# Patient Record
Sex: MALE | URBAN - NONMETROPOLITAN AREA
[De-identification: names, ages, dates, MRNs, and addresses within clinical notes are randomized per-mention and may not be internally consistent; named-entity substitution may affect disease eponyms.]

---

## 2024-02-01 ENCOUNTER — TRANSCRIBE ORDERS (OUTPATIENT)
Dept: CARDIOLOGY CLINIC | Facility: CLINIC | Age: 64
End: 2024-02-01

## 2024-02-01 ENCOUNTER — TELEPHONE (OUTPATIENT)
Dept: CARDIOLOGY CLINIC | Facility: CLINIC | Age: 64
End: 2024-02-01

## 2024-02-01 DIAGNOSIS — N43.40 SPERMATOCELE: Primary | ICD-10-CM

## 2024-03-01 RX ORDER — LORATADINE 10 MG/1
10 TABLET ORAL AS NEEDED
COMMUNITY

## 2024-03-05 ENCOUNTER — OFFICE VISIT (OUTPATIENT)
Dept: UROLOGY | Facility: CLINIC | Age: 64
End: 2024-03-05
Payer: COMMERCIAL

## 2024-03-05 VITALS
BODY MASS INDEX: 24.53 KG/M2 | HEIGHT: 71 IN | TEMPERATURE: 98.3 F | WEIGHT: 175.2 LBS | HEART RATE: 73 BPM | RESPIRATION RATE: 18 BRPM | SYSTOLIC BLOOD PRESSURE: 132 MMHG | DIASTOLIC BLOOD PRESSURE: 70 MMHG | OXYGEN SATURATION: 99 %

## 2024-03-05 DIAGNOSIS — R35.1 BENIGN PROSTATIC HYPERPLASIA WITH NOCTURIA: ICD-10-CM

## 2024-03-05 DIAGNOSIS — N40.1 BENIGN PROSTATIC HYPERPLASIA WITH NOCTURIA: ICD-10-CM

## 2024-03-05 DIAGNOSIS — N43.40 SPERMATOCELE: Primary | ICD-10-CM

## 2024-03-05 LAB
SL AMB  POCT GLUCOSE, UA: NORMAL
SL AMB LEUKOCYTE ESTERASE,UA: NORMAL
SL AMB POCT BILIRUBIN,UA: NORMAL
SL AMB POCT BLOOD,UA: NORMAL
SL AMB POCT CLARITY,UA: CLEAR
SL AMB POCT COLOR,UA: YELLOW
SL AMB POCT KETONES,UA: NORMAL
SL AMB POCT NITRITE,UA: NORMAL
SL AMB POCT PH,UA: 5.5
SL AMB POCT SPECIFIC GRAVITY,UA: 1.01
SL AMB POCT URINE PROTEIN: NORMAL
SL AMB POCT UROBILINOGEN: 0.2

## 2024-03-05 PROCEDURE — 99213 OFFICE O/P EST LOW 20 MIN: CPT | Performed by: UROLOGY

## 2024-03-05 PROCEDURE — 81003 URINALYSIS AUTO W/O SCOPE: CPT | Performed by: UROLOGY

## 2024-03-05 RX ORDER — SIMVASTATIN 20 MG
20 TABLET ORAL
COMMUNITY

## 2024-03-05 RX ORDER — LISINOPRIL 10 MG/1
10 TABLET ORAL DAILY
COMMUNITY

## 2024-03-05 NOTE — PROGRESS NOTES
UROLOGY PROGRESS NOTE         NAME: Ian Monroe  AGE: 63 y.o. SEX: male  : 1960   MRN: [id number]    DATE: 3/5/2024  TIME: 12:28 PM    Assessment and Plan      Impression:   1. Spermatocele  -     Ambulatory referral to Urology  -     POCT urine dip auto non-scope    2. Benign prostatic hyperplasia with nocturia    History of kidney stone.  History of BPH.  History of a spermatocele.  Patient is having more bothersome symptoms with his spermatocele has gotten larger.  He gets in the way sometimes.  Pros and cons surgical procedure and conservative measures discussed.     Plan: We will see him back in the fall after he travels with a PSA and scrotal ultrasound consider spermatocele ectomy.      Chief Complaint     Chief Complaint   Patient presents with    New Patient Visit     Has been seen in the past by urology at [facility]. Patient has a testicle lump to the left side that has been monitored over the past few years. He said there are times that he feels pressure to the area. Wanted to discuss options of having the area removed if possible.      History of Present Illness     HPI: Ian Monroe is a 63 y.o. year old male who presents with followed for many years by Dr. Mathews.  He had a kidney stone in .  He has a spermatocele which been enlarging over the years.  And now it does bother him sometimes it gets in the way when he sleeping and can have some discomfort but for the most part it does not bother him.  No more kidney stone troubles.  PSAs have always been low.  He has very mild BPH with nocturia once a night.  This is also not bothersome.  His urinalysis is negative today.              The following portions of the patient's history were reviewed and updated as appropriate: allergies, current medications, past family history, past medical history, past social history, past surgical history and problem list.  Past Medical History:   Diagnosis Date    BPH (benign prostatic hyperplasia)      "Hematuria     HTN (hypertension)     Renal calculi     Spermatocele     Umbilical hernia      History reviewed. No pertinent surgical history.  shoulder  Review of Systems     Const: Denies chills, fever and weight loss.  CV: Denies chest pain.  Resp: Denies SOB.  GI: Denies abdominal pain, nausea and vomiting.  : Denies symptoms other than stated above.  Musculo: Denies back pain.    Objective   /70   Pulse 73   Temp 98.3 °F (36.8 °C) (Tympanic)   Resp 18   Ht 5' 10.5\" (1.791 m)   Wt 79.5 kg (175 lb 3.2 oz)   SpO2 99%   BMI 24.78 kg/m²     Physical Exam  Const: Appears healthy and well developed. No signs of acute distress present.  Resp: Respirations are regular and unlabored.   CV: Rate is regular. Rhythm is regular.  Abdomen: Abdomen is soft, nontender, and nondistended. Kidneys are not palpable.  : Penis testicles normal.  He has a large left spermatocele.  Does not feel like he has a hernia although I was unable to be certain.  Ultrasound will be best.  Prostate 1+ benign.  Nontender.  It does transilluminate.  Psych: Patient's attitude is cooperative. Mood is normal. Affect is normal.    Current Medications     Current Outpatient Medications:     Coenzyme Q10 10 MG capsule, Take 10 mg by mouth daily, Disp: , Rfl:     lisinopril (ZESTRIL) 10 mg tablet, Take 10 mg by mouth daily, Disp: , Rfl:     loratadine (CLARITIN) 10 mg tablet, Take 10 mg by mouth if needed, Disp: , Rfl:     simvastatin (ZOCOR) 20 mg tablet, Take 20 mg by mouth daily at bedtime, Disp: , Rfl:         Frank D'Amico, MD        "

## 2024-10-30 ENCOUNTER — TELEPHONE (OUTPATIENT)
Dept: UROLOGY | Facility: CLINIC | Age: 64
End: 2024-10-30

## 2024-11-01 ENCOUNTER — HOSPITAL ENCOUNTER (OUTPATIENT)
Dept: ULTRASOUND IMAGING | Facility: HOSPITAL | Age: 64
End: 2024-11-01
Attending: UROLOGY
Payer: COMMERCIAL

## 2024-11-01 DIAGNOSIS — N43.40 SPERMATOCELE: ICD-10-CM

## 2024-11-01 DIAGNOSIS — R35.1 BENIGN PROSTATIC HYPERPLASIA WITH NOCTURIA: ICD-10-CM

## 2024-11-01 DIAGNOSIS — N40.1 BENIGN PROSTATIC HYPERPLASIA WITH NOCTURIA: ICD-10-CM

## 2024-11-01 PROCEDURE — 76870 US EXAM SCROTUM: CPT

## 2024-11-06 ENCOUNTER — TELEPHONE (OUTPATIENT)
Dept: UROLOGY | Facility: CLINIC | Age: 64
End: 2024-11-06

## 2024-11-06 ENCOUNTER — OFFICE VISIT (OUTPATIENT)
Dept: UROLOGY | Facility: CLINIC | Age: 64
End: 2024-11-06
Payer: COMMERCIAL

## 2024-11-06 VITALS
OXYGEN SATURATION: 98 % | HEIGHT: 70 IN | BODY MASS INDEX: 26 KG/M2 | SYSTOLIC BLOOD PRESSURE: 140 MMHG | HEART RATE: 94 BPM | DIASTOLIC BLOOD PRESSURE: 66 MMHG | TEMPERATURE: 98.6 F | WEIGHT: 181.6 LBS

## 2024-11-06 DIAGNOSIS — N43.40 SPERMATOCELE: Primary | ICD-10-CM

## 2024-11-06 PROCEDURE — 99214 OFFICE O/P EST MOD 30 MIN: CPT | Performed by: UROLOGY

## 2024-11-06 RX ORDER — CIPROFLOXACIN 2 MG/ML
400 INJECTION, SOLUTION INTRAVENOUS ONCE
OUTPATIENT
Start: 2024-11-06 | End: 2024-11-06

## 2024-11-06 NOTE — TELEPHONE ENCOUNTER
Sent message to patient in the patient portal that he can review his test results and that they will be reviewed with the provider at the appointment today.

## 2024-11-06 NOTE — TELEPHONE ENCOUNTER
----- Message from Frank D'Amico, MD sent at 11/5/2024  3:26 PM EST -----  These copy patient will discuss at visit tomorrow.  Thank you  ----- Message -----  From: Interface, Radiology Results In  Sent: 11/1/2024  10:43 AM EST  To: Frank D'Amico, MD

## 2024-11-06 NOTE — PROGRESS NOTES
"UROLOGY PROGRESS NOTE         NAME: Ian Monroe  AGE: 64 y.o. SEX: male  : 1960   MRN: [id number]    DATE: 2024  TIME: 2:32 PM    Assessment and Plan      Impression:   1. Spermatocele  -     Case request operating room: SPERMATOCELECTOMY; Standing  -     Case request operating room: SPERMATOCELECTOMY    He would like to proceed with left spermatocelectomy pros cons options expectations process discussed.   Plan: Spermatocelectomy at his discretion.      Chief Complaint     Chief Complaint   Patient presents with    Follow-up     History of Present Illness     HPI: Ian Monroe is a 64 y.o. year old male who presents with history of a large left scrotal mass turns out to be a spermatocele on ultrasound.  Testicles are normal.  This is bothersome he gets in the way of things.  We talked about the surgical procedure to take care of this.  Risk of complications and recovery discussed.  He may or may not need a drain.              The following portions of the patient's history were reviewed and updated as appropriate: allergies, current medications, past family history, past medical history, past social history, past surgical history and problem list.  Past Medical History:   Diagnosis Date    BPH (benign prostatic hyperplasia)     Hematuria     HTN (hypertension)     Kidney stone     Renal calculi     Spermatocele     Umbilical hernia      Past Surgical History:   Procedure Laterality Date    LITHOTRIPSY       shoulder  Review of Systems     Const: Denies chills, fever and weight loss.  CV: Denies chest pain.  Resp: Denies SOB.  GI: Denies abdominal pain, nausea and vomiting.  : Denies symptoms other than stated above.  Musculo: Denies back pain.    Objective   /66   Pulse 94   Temp 98.6 °F (37 °C)   Ht 5' 10\" (1.778 m)   Wt 82.4 kg (181 lb 9.6 oz)   SpO2 98%   BMI 26.06 kg/m²     Physical Exam  Const: Appears healthy and well developed. No signs of acute distress present.  Resp: " Respirations are regular and unlabored.   CV: Rate is regular. Rhythm is regular.  Abdomen: Abdomen is soft, nontender, and nondistended. Kidneys are not palpable.  : Large left scrotal lesion no hernias noted.  Testicles are normal.  Spermatocele.  Psych: Patient's attitude is cooperative. Mood is normal. Affect is normal.    Current Medications     Current Outpatient Medications:     lisinopril (ZESTRIL) 10 mg tablet, Take 10 mg by mouth daily, Disp: , Rfl:     simvastatin (ZOCOR) 20 mg tablet, Take 20 mg by mouth daily at bedtime, Disp: , Rfl:     Coenzyme Q10 10 MG capsule, Take 10 mg by mouth daily (Patient not taking: Reported on 11/6/2024), Disp: , Rfl:     loratadine (CLARITIN) 10 mg tablet, Take 10 mg by mouth if needed (Patient not taking: Reported on 11/6/2024), Disp: , Rfl:         Frank D'Amico, MD

## 2024-11-06 NOTE — Clinical Note
Please set him up for a left spermatocele ectomy general anesthesia.  Follow-up for drain removal 2 to 3 days later.

## 2024-11-07 ENCOUNTER — TELEPHONE (OUTPATIENT)
Dept: UROLOGY | Facility: CLINIC | Age: 64
End: 2024-11-07

## 2024-11-07 NOTE — TELEPHONE ENCOUNTER
Spoke to pt, provided available dates for OR with Dr D'Amico. He will c/b after speaking to sister.

## 2024-11-08 ENCOUNTER — PREP FOR PROCEDURE (OUTPATIENT)
Dept: UROLOGY | Facility: CLINIC | Age: 64
End: 2024-11-08

## 2024-11-08 DIAGNOSIS — R39.89 SUSPECTED UTI: ICD-10-CM

## 2024-11-08 DIAGNOSIS — Z01.818 ENCOUNTER FOR PREADMISSION TESTING: Primary | ICD-10-CM

## 2024-11-08 NOTE — TELEPHONE ENCOUNTER
Spoke with patient and confirmed surgery date of: 1/3  Type of surgery: left spermatocelectomy  Operating physician: Dr. D'Amico  Location of surgery: [facility]    Verbally went over prep with patient on: 11/8  NPO  Bowel prep? No  Hospital calls afternoon prior with arrival time -Calls Friday afternoon for Monday surgeries  Patient needs ride to and from surgery (outpatient)   Pre-op testing to be done 2 weeks prior to surgery  (CBC, BMP, UC, EKG)  Blood thinners: N/A  Clearances needed: (None)    Mailed to patient on: 11/8  Copy of packet scanned into Media on: 11/8  Labs in packet  Soap / Bowel prep in packet  Post-op in packet  Date of post-op 1/6    Consent: on admit

## 2024-12-20 NOTE — PRE-PROCEDURE INSTRUCTIONS
Pre-Surgery Instructions:   Medication Instructions    lisinopril (ZESTRIL) 10 mg tablet Hold day of surgery.    simvastatin (ZOCOR) 20 mg tablet Take night before surgery   Medication instructions for day surgery reviewed. Please use only a sip of water to take your instructed medications. Avoid all over the counter vitamins, supplements and NSAIDS for one week prior to surgery per anesthesia guidelines. Tylenol is ok to take as needed.     You will receive a call one business day prior to surgery with an arrival time and hospital directions. If your surgery is scheduled on a Monday, the hospital will be calling you on the Friday prior to your surgery. If you have not heard from anyone by 8pm, please call the hospital supervisor through the hospital  at [phone]. ([place] [phone] or [place] [phone]).    Do not eat or drink anything after midnight the night before your surgery, including candy, mints, lifesavers, or chewing gum. Do not drink alcohol 24hrs before your surgery. Try not to smoke at least 24hrs before your surgery.       Follow the pre surgery showering instructions as listed in the “My Surgical Experience Booklet” or otherwise provided by your surgeon's office. Do not use a blade to shave the surgical area 1 week before surgery. It is okay to use a clean electric clippers up to 24 hours before surgery. Do not apply any lotions, creams, including makeup, cologne, deodorant, or perfumes after showering on the day of your surgery. Do not use dry shampoo, hair spray, hair gel, or any type of hair products.     No contact lenses, eye make-up, or artificial eyelashes. Remove nail polish, including gel polish, and any artificial, gel, or acrylic nails if possible. Remove all jewelry including rings and body piercing jewelry.     Wear causal clothing that is easy to take on and off. Consider your type of surgery.    Keep any valuables, jewelry, piercings at home. Please bring any  specially ordered equipment (sling, braces) if indicated.    Arrange for a responsible person to drive you to and from the hospital on the day of your surgery. Please confirm the visitor policy for the day of your procedure when you receive your phone call with an arrival time.     Call the surgeon's office with any new illnesses, exposures, or additional questions prior to surgery.    Please reference your “My Surgical Experience Booklet” for additional information to prepare for your upcoming surgery.

## 2024-12-27 ENCOUNTER — LAB (OUTPATIENT)
Dept: LAB | Facility: HOSPITAL | Age: 64
End: 2024-12-27
Payer: COMMERCIAL

## 2024-12-27 ENCOUNTER — APPOINTMENT (OUTPATIENT)
Dept: LAB | Facility: HOSPITAL | Age: 64
End: 2024-12-27
Attending: UROLOGY
Payer: COMMERCIAL

## 2024-12-27 DIAGNOSIS — N40.1 BENIGN PROSTATIC HYPERPLASIA WITH NOCTURIA: ICD-10-CM

## 2024-12-27 DIAGNOSIS — Z01.818 ENCOUNTER FOR PREADMISSION TESTING: ICD-10-CM

## 2024-12-27 DIAGNOSIS — R35.1 BENIGN PROSTATIC HYPERPLASIA WITH NOCTURIA: ICD-10-CM

## 2024-12-27 DIAGNOSIS — N43.40 SPERMATOCELE: ICD-10-CM

## 2024-12-27 LAB
ANION GAP SERPL CALCULATED.3IONS-SCNC: 6 MMOL/L (ref 4–13)
ATRIAL RATE: 81 BPM
BASOPHILS # BLD AUTO: 0.09 THOUSANDS/ÂΜL (ref 0–0.1)
BASOPHILS NFR BLD AUTO: 1 % (ref 0–1)
BUN SERPL-MCNC: 13 MG/DL (ref 5–25)
CALCIUM SERPL-MCNC: 9.2 MG/DL (ref 8.4–10.2)
CHLORIDE SERPL-SCNC: 103 MMOL/L (ref 96–108)
CO2 SERPL-SCNC: 29 MMOL/L (ref 21–32)
CREAT SERPL-MCNC: 1.01 MG/DL (ref 0.6–1.3)
EOSINOPHIL # BLD AUTO: 0.13 THOUSAND/ÂΜL (ref 0–0.61)
EOSINOPHIL NFR BLD AUTO: 2 % (ref 0–6)
ERYTHROCYTE [DISTWIDTH] IN BLOOD BY AUTOMATED COUNT: 12.7 % (ref 11.6–15.1)
GFR SERPL CREATININE-BSD FRML MDRD: 78 ML/MIN/1.73SQ M
GLUCOSE P FAST SERPL-MCNC: 108 MG/DL (ref 65–99)
HCT VFR BLD AUTO: 45.8 % (ref 36.5–49.3)
HGB BLD-MCNC: 15.3 G/DL (ref 12–17)
IMM GRANULOCYTES # BLD AUTO: 0.03 THOUSAND/UL (ref 0–0.2)
IMM GRANULOCYTES NFR BLD AUTO: 1 % (ref 0–2)
LYMPHOCYTES # BLD AUTO: 1.59 THOUSANDS/ÂΜL (ref 0.6–4.47)
LYMPHOCYTES NFR BLD AUTO: 26 % (ref 14–44)
MCH RBC QN AUTO: 29.5 PG (ref 26.8–34.3)
MCHC RBC AUTO-ENTMCNC: 33.4 G/DL (ref 31.4–37.4)
MCV RBC AUTO: 88 FL (ref 82–98)
MONOCYTES # BLD AUTO: 0.52 THOUSAND/ÂΜL (ref 0.17–1.22)
MONOCYTES NFR BLD AUTO: 8 % (ref 4–12)
NEUTROPHILS # BLD AUTO: 3.86 THOUSANDS/ÂΜL (ref 1.85–7.62)
NEUTS SEG NFR BLD AUTO: 62 % (ref 43–75)
NRBC BLD AUTO-RTO: 0 /100 WBCS
P AXIS: 72 DEGREES
PLATELET # BLD AUTO: 264 THOUSANDS/UL (ref 149–390)
PMV BLD AUTO: 10 FL (ref 8.9–12.7)
POTASSIUM SERPL-SCNC: 4.6 MMOL/L (ref 3.5–5.3)
PR INTERVAL: 152 MS
PSA SERPL-MCNC: 1.98 NG/ML (ref 0–4)
QRS AXIS: 49 DEGREES
QRSD INTERVAL: 88 MS
QT INTERVAL: 370 MS
QTC INTERVAL: 429 MS
RBC # BLD AUTO: 5.19 MILLION/UL (ref 3.88–5.62)
SODIUM SERPL-SCNC: 138 MMOL/L (ref 135–147)
T WAVE AXIS: 51 DEGREES
VENTRICULAR RATE: 81 BPM
WBC # BLD AUTO: 6.22 THOUSAND/UL (ref 4.31–10.16)

## 2024-12-27 PROCEDURE — 36415 COLL VENOUS BLD VENIPUNCTURE: CPT

## 2024-12-27 PROCEDURE — 85025 COMPLETE CBC W/AUTO DIFF WBC: CPT

## 2024-12-27 PROCEDURE — 93005 ELECTROCARDIOGRAM TRACING: CPT

## 2024-12-27 PROCEDURE — 80048 BASIC METABOLIC PNL TOTAL CA: CPT

## 2024-12-27 PROCEDURE — 84153 ASSAY OF PSA TOTAL: CPT

## 2024-12-28 LAB — BACTERIA UR CULT: NORMAL

## 2025-01-03 ENCOUNTER — ANESTHESIA (OUTPATIENT)
Dept: PERIOP | Facility: HOSPITAL | Age: 65
End: 2025-01-03
Payer: COMMERCIAL

## 2025-01-03 ENCOUNTER — ANESTHESIA EVENT (OUTPATIENT)
Dept: PERIOP | Facility: HOSPITAL | Age: 65
End: 2025-01-03
Payer: COMMERCIAL

## 2025-01-03 ENCOUNTER — HOSPITAL ENCOUNTER (OUTPATIENT)
Facility: HOSPITAL | Age: 65
Setting detail: OUTPATIENT SURGERY
Discharge: HOME | End: 2025-01-03
Attending: UROLOGY | Admitting: UROLOGY
Payer: COMMERCIAL

## 2025-01-03 VITALS
HEIGHT: 70 IN | TEMPERATURE: 97.8 F | OXYGEN SATURATION: 96 % | DIASTOLIC BLOOD PRESSURE: 71 MMHG | RESPIRATION RATE: 18 BRPM | BODY MASS INDEX: 25.91 KG/M2 | WEIGHT: 181 LBS | SYSTOLIC BLOOD PRESSURE: 133 MMHG | HEART RATE: 88 BPM

## 2025-01-03 DIAGNOSIS — N43.40 SPERMATOCELE: ICD-10-CM

## 2025-01-03 DIAGNOSIS — N43.40 SPERMATOCELE: Primary | ICD-10-CM

## 2025-01-03 PROCEDURE — NC001 PR NO CHARGE: Performed by: UROLOGY

## 2025-01-03 PROCEDURE — 88304 TISSUE EXAM BY PATHOLOGIST: CPT | Performed by: PATHOLOGY

## 2025-01-03 PROCEDURE — 54840 REMOVE EPIDIDYMIS LESION: CPT | Performed by: UROLOGY

## 2025-01-03 RX ORDER — KETOROLAC TROMETHAMINE 30 MG/ML
INJECTION, SOLUTION INTRAMUSCULAR; INTRAVENOUS AS NEEDED
Status: DISCONTINUED | OUTPATIENT
Start: 2025-01-03 | End: 2025-01-03

## 2025-01-03 RX ORDER — FENTANYL CITRATE/PF 50 MCG/ML
25 SYRINGE (ML) INJECTION
Status: DISCONTINUED | OUTPATIENT
Start: 2025-01-03 | End: 2025-01-03 | Stop reason: HOSPADM

## 2025-01-03 RX ORDER — CIPROFLOXACIN 2 MG/ML
400 INJECTION, SOLUTION INTRAVENOUS ONCE
Status: COMPLETED | OUTPATIENT
Start: 2025-01-03 | End: 2025-01-03

## 2025-01-03 RX ORDER — OXYCODONE AND ACETAMINOPHEN 5; 325 MG/1; MG/1
1-2 TABLET ORAL EVERY 6 HOURS PRN
Qty: 20 TABLET | Refills: 0 | Status: SHIPPED | OUTPATIENT
Start: 2025-01-03

## 2025-01-03 RX ORDER — EPHEDRINE SULFATE 50 MG/ML
INJECTION INTRAVENOUS AS NEEDED
Status: DISCONTINUED | OUTPATIENT
Start: 2025-01-03 | End: 2025-01-03

## 2025-01-03 RX ORDER — PROPOFOL 10 MG/ML
INJECTION, EMULSION INTRAVENOUS AS NEEDED
Status: DISCONTINUED | OUTPATIENT
Start: 2025-01-03 | End: 2025-01-03

## 2025-01-03 RX ORDER — DEXAMETHASONE SODIUM PHOSPHATE 10 MG/ML
INJECTION, SOLUTION INTRAMUSCULAR; INTRAVENOUS AS NEEDED
Status: DISCONTINUED | OUTPATIENT
Start: 2025-01-03 | End: 2025-01-03

## 2025-01-03 RX ORDER — HYDROMORPHONE HCL/PF 1 MG/ML
0.5 SYRINGE (ML) INJECTION
Status: DISCONTINUED | OUTPATIENT
Start: 2025-01-03 | End: 2025-01-03 | Stop reason: HOSPADM

## 2025-01-03 RX ORDER — FENTANYL CITRATE 50 UG/ML
INJECTION, SOLUTION INTRAMUSCULAR; INTRAVENOUS AS NEEDED
Status: DISCONTINUED | OUTPATIENT
Start: 2025-01-03 | End: 2025-01-03

## 2025-01-03 RX ORDER — BUPIVACAINE HYDROCHLORIDE 5 MG/ML
INJECTION, SOLUTION EPIDURAL; INTRACAUDAL AS NEEDED
Status: DISCONTINUED | OUTPATIENT
Start: 2025-01-03 | End: 2025-01-03 | Stop reason: HOSPADM

## 2025-01-03 RX ORDER — ONDANSETRON 2 MG/ML
INJECTION INTRAMUSCULAR; INTRAVENOUS AS NEEDED
Status: DISCONTINUED | OUTPATIENT
Start: 2025-01-03 | End: 2025-01-03

## 2025-01-03 RX ORDER — LIDOCAINE HYDROCHLORIDE 10 MG/ML
INJECTION, SOLUTION EPIDURAL; INFILTRATION; INTRACAUDAL; PERINEURAL AS NEEDED
Status: DISCONTINUED | OUTPATIENT
Start: 2025-01-03 | End: 2025-01-03

## 2025-01-03 RX ORDER — SODIUM CHLORIDE, SODIUM LACTATE, POTASSIUM CHLORIDE, CALCIUM CHLORIDE 600; 310; 30; 20 MG/100ML; MG/100ML; MG/100ML; MG/100ML
INJECTION, SOLUTION INTRAVENOUS CONTINUOUS PRN
Status: DISCONTINUED | OUTPATIENT
Start: 2025-01-03 | End: 2025-01-03

## 2025-01-03 RX ORDER — MAGNESIUM HYDROXIDE 1200 MG/15ML
LIQUID ORAL AS NEEDED
Status: DISCONTINUED | OUTPATIENT
Start: 2025-01-03 | End: 2025-01-03 | Stop reason: HOSPADM

## 2025-01-03 RX ORDER — CIPROFLOXACIN 500 MG/1
500 TABLET, FILM COATED ORAL EVERY 12 HOURS SCHEDULED
Qty: 10 TABLET | Refills: 0 | Status: SHIPPED | OUTPATIENT
Start: 2025-01-03 | End: 2025-01-08

## 2025-01-03 RX ORDER — MIDAZOLAM HYDROCHLORIDE 2 MG/2ML
INJECTION, SOLUTION INTRAMUSCULAR; INTRAVENOUS AS NEEDED
Status: DISCONTINUED | OUTPATIENT
Start: 2025-01-03 | End: 2025-01-03

## 2025-01-03 RX ADMIN — LIDOCAINE HYDROCHLORIDE 50 MG: 10 INJECTION, SOLUTION EPIDURAL; INFILTRATION; INTRACAUDAL; PERINEURAL at 12:16

## 2025-01-03 RX ADMIN — PROPOFOL 70 MCG/KG/MIN: 10 INJECTION, EMULSION INTRAVENOUS at 12:17

## 2025-01-03 RX ADMIN — PHENYLEPHRINE HYDROCHLORIDE 30 MCG/MIN: 10 INJECTION INTRAVENOUS at 12:27

## 2025-01-03 RX ADMIN — FENTANYL CITRATE 50 MCG: 50 INJECTION, SOLUTION INTRAMUSCULAR; INTRAVENOUS at 12:16

## 2025-01-03 RX ADMIN — PROPOFOL 200 MG: 10 INJECTION, EMULSION INTRAVENOUS at 12:16

## 2025-01-03 RX ADMIN — FENTANYL CITRATE 50 MCG: 50 INJECTION, SOLUTION INTRAMUSCULAR; INTRAVENOUS at 12:24

## 2025-01-03 RX ADMIN — ONDANSETRON 4 MG: 2 INJECTION INTRAMUSCULAR; INTRAVENOUS at 12:16

## 2025-01-03 RX ADMIN — DEXAMETHASONE SODIUM PHOSPHATE 10 MG: 10 INJECTION, SOLUTION INTRAMUSCULAR; INTRAVENOUS at 12:16

## 2025-01-03 RX ADMIN — SODIUM CHLORIDE, SODIUM LACTATE, POTASSIUM CHLORIDE, AND CALCIUM CHLORIDE: .6; .31; .03; .02 INJECTION, SOLUTION INTRAVENOUS at 12:12

## 2025-01-03 RX ADMIN — EPHEDRINE SULFATE 5 MG: 50 INJECTION, SOLUTION INTRAVENOUS at 12:52

## 2025-01-03 RX ADMIN — KETOROLAC TROMETHAMINE 15 MG: 30 INJECTION, SOLUTION INTRAMUSCULAR at 13:01

## 2025-01-03 RX ADMIN — MIDAZOLAM HYDROCHLORIDE 2 MG: 1 INJECTION, SOLUTION INTRAMUSCULAR; INTRAVENOUS at 12:10

## 2025-01-03 RX ADMIN — CIPROFLOXACIN: 2 INJECTION INTRAVENOUS at 12:17

## 2025-01-03 NOTE — ANESTHESIA POSTPROCEDURE EVALUATION
Post-Op Assessment Note    Last Filed PACU Vitals:  Vitals Value Taken Time   Temp 97.6 °F (36.4 °C) 01/03/25 1337   Pulse 99 01/03/25 1337   /71 01/03/25 1337   Resp 20 01/03/25 1337   SpO2 93 % 01/03/25 1337       Modified Yosef:     Vitals Value Taken Time   Activity 2 01/03/25 1337   Respiration 2 01/03/25 1337   Circulation 2 01/03/25 1337   Consciousness 2 01/03/25 1337   Oxygen Saturation 2 01/03/25 1337     Modified Yosef Score: 10

## 2025-01-03 NOTE — H&P
H&P Exam - Urology   Ian Monroe 64 y.o. male MRN: [id number]  Unit/Bed#: OR [place] Encounter: [id number]    Assessment & Plan     Assessment:  Large left spermatocele symptomatic.  Plan:  Left spermatocele ectomy.    History of Present Illness   HPI:  Ian Monroe is a 64 y.o. male who presents with patient with a symptomatic left spermatocele presents for left spermatocele ectomy.  Outcomes risks discussed.  Process discussed..    Review of Systems:  Const: Denies chills, fever and weight loss.  CV: Denies chest pain.  Resp: Denies SOB.  GI: Denies abdominal pain, nausea and vomiting.  : Denies symptoms other than stated above.  Musculo: Denies back pain.    Historical Information   Past Medical History:   Diagnosis Date    BPH (benign prostatic hyperplasia)     Hematuria     HTN (hypertension)     Hyperlipidemia     Hypertension     Kidney stone     Renal calculi     Spermatocele     Umbilical hernia      Past Surgical History:   Procedure Laterality Date    COLONOSCOPY      LITHOTRIPSY       Social History   Social History     Substance and Sexual Activity   Alcohol Use Yes    Alcohol/week: 8.0 standard drinks of alcohol    Types: 8 Standard drinks or equivalent per week    Comment: socially on weekends     Social History     Substance and Sexual Activity   Drug Use Never     Social History     Tobacco Use   Smoking Status Former    Types: Cigars    Start date:     Quit date: 10/2/2023    Years since quittin.2   Smokeless Tobacco Never     E-Cigarette/Vaping    E-Cigarette Use Never User      E-Cigarette/Vaping Substances    Nicotine No     THC No     CBD No     Flavoring No     Other No     Unknown No      Family History: non-contributory    Meds/Allergies   all medications and allergies reviewed  Allergies   Allergen Reactions    Penicillins Hives     hives       Objective   Vitals: Blood pressure 155/82, pulse 85, temperature 97.6 °F (36.4 °C), temperature source Temporal, resp. rate 20, height 5'  "10\" (1.778 m), weight 82.1 kg (181 lb), SpO2 98%.    No intake/output data recorded.    Invasive Devices       Peripheral Intravenous Line  Duration             Peripheral IV 01/03/25 Dorsal (posterior);Left Hand <1 day                    Physical Exam:  Physical Exam  Const: Appears healthy and well developed. No signs of acute distress present.  Resp: Respirations are regular and unlabored.   CV: Rate is regular. Rhythm is regular.  Abdomen: Abdomen is soft, nontender, and nondistended. Kidneys are not palpable.  : Large left spermatocele.  Psych: Patient's attitude is cooperative. Mood is normal. Affect is normal.    Lab Results: I have personally reviewed pertinent reports.    Imaging: I have personally reviewed pertinent reports.   and I have personally reviewed pertinent films in PACS  EKG, Pathology, and Other Studies: I have personally reviewed pertinent reports.   and I have personally reviewed pertinent films in PACS  VTE Prophylaxis: Sequential compression device (Venodyne)     Code Status: No Order  Advance Directive and Living Will:      Power of :    POLST:      Counseling / Coordination of Care  Total floor / unit time spent today 15 minutes.  Greater than 50% of total time was spent with the patient and / or family counseling and / or coordination of care.  A description of the counseling / coordination of care: .     "

## 2025-01-03 NOTE — ANESTHESIA PREPROCEDURE EVALUATION
Procedure:  SPERMATOCELECTOMY (Left: Scrotum)    Relevant Problems   No relevant active problems        Physical Exam    Airway    Mallampati score: II  TM Distance: >3 FB  Neck ROM: full     Dental   No notable dental hx     Cardiovascular  Cardiovascular exam normal    Pulmonary  Pulmonary exam normal     Other Findings    HTN    BPH    Kidney Stones      Normal sinus rhythm  Normal ECG  No previous ECGs available       Anesthesia Plan  ASA Score- 2     Anesthesia Type- general with ASA Monitors.         Additional Monitors:     Airway Plan: LMA.           Plan Factors-Exercise tolerance (METS): >4 METS.    Chart reviewed. EKG reviewed. Imaging results reviewed. Existing labs reviewed. Patient summary reviewed.    Patient is not a current smoker.      Obstructive sleep apnea risk education given perioperatively.        Induction- intravenous.    Postoperative Plan-     Perioperative Resuscitation Plan - Level 1 - Full Code.       Informed Consent- Anesthetic plan and risks discussed with patient.  I personally reviewed this patient with the CRNA. Discussed and agreed on the Anesthesia Plan with the CRNA..

## 2025-01-03 NOTE — ANESTHESIA POSTPROCEDURE EVALUATION
Post-Op Assessment Note    CV Status:  Stable    Pain management: adequate       Mental Status:  Sleepy   Hydration Status:  Euvolemic   PONV Controlled:  Controlled   Airway Patency:  Patent     Post Op Vitals Reviewed: Yes    No anethesia notable event occurred.    Staff: CRNA           Last Filed PACU Vitals:  Vitals Value Taken Time   Temp 97.6 °F (36.4 °C) 01/03/25 1308   Pulse 79 01/03/25 1308   /59 01/03/25 1308   Resp 15 01/03/25 1308   SpO2 98 % 01/03/25 1308

## 2025-01-03 NOTE — OP NOTE
OPERATIVE REPORT  PATIENT NAME: Ian Monroe    :  1960  MRN: [id number]  Pt Location: [facility] OR ROOM 01    SURGERY DATE: 1/3/2025    Surgeons and Role:     * Frank D'Amico, MD - Primary     * Caryn Zambrano PA-C - Assisting    Preop Diagnosis:  Spermatocele [N43.40]    Post-Op Diagnosis Codes:     * Spermatocele [N43.40]    Procedure(s):  Left - SPERMATOCELECTOMY    Specimen(s):  ID Type Source Tests Collected by Time Destination   1 : Left spermatocele sac Tissue Spermatocele TISSUE EXAM Frank D'Amico, MD 1/3/2025 12:35 PM        Estimated Blood Loss:   Minimal    Drains:  Closed/Suction Drain Left Other (Comment) Bulb 7 Fr. (Active)   Site Description Unable to view 25 130   Dressing Status Clean;Dry;Intact 25 130   Drainage Appearance None 25 130   Status To bulb suction 25 130   Number of days: 0       Anesthesia Type:   General/LMA    Operative Indications:  Spermatocele [N43.40]  Symptomatic    Operative Findings:  Large left spermatocele.      Complications:   None    Procedure and Technique:  Patient brought the operating.  Hard timeout.  Supine position.  Prepped and draped in sterile fashion.  SCDs in place.  Antibiotics on board.  Informed consent.  Midline incision was performed after he was anesthetized with half percent plain Marcaine.  25-gauge needle used.  Incision then performed.  Left hemiscrotum dissected out.  The spermatocele sac was dissected down to its base at the base of the epididymis.  Testicle.  Be normal.  There was no significant hydrocele noted.  Hydrocele sac was opened and then subsequently closed with a running 3-0 chromic stitch.  No abnormalities of testicle were noted.  No abnormalities of the epididymis were noted other than a single spermatocele.  Spermatocele sac was sent to the pathologist after it was removed.  The base of the spermatocele was oversewn with a 3-0 chromic stitch.  Bleeding was controlled with electrocautery.  There was  minimal to no bleeding noted.  The left testicle was dropped back into the left hemiscrotum.  The potential space was drained with a 7 mm MARELY drain through a separate stab incision at the lower edge of the incision.  This was sutured in position with a 2-0 nylon suture.  Hooked up to suction drainage.  The wound was closed in 2 layers.  Dartos layer closed with a running 3-0 chromic stitch.  The skin closed with a running 4-0 Monocryl stitch.  Fluffs and scrotal support was applied.  Patient brought to recovery room in stable condition.  There were no complications of the procedure.  Sponge and needle counts correct.   I was present for the entire procedure.    Patient Disposition:  PACU              SIGNATURE: Frank D'Amico, MD  DATE: January 3, 2025  TIME: 1:13 PM

## 2025-01-06 ENCOUNTER — OFFICE VISIT (OUTPATIENT)
Dept: UROLOGY | Facility: CLINIC | Age: 65
End: 2025-01-06

## 2025-01-06 VITALS
OXYGEN SATURATION: 98 % | WEIGHT: 182 LBS | HEART RATE: 94 BPM | HEIGHT: 70 IN | TEMPERATURE: 98.3 F | DIASTOLIC BLOOD PRESSURE: 66 MMHG | SYSTOLIC BLOOD PRESSURE: 150 MMHG | BODY MASS INDEX: 26.05 KG/M2

## 2025-01-06 DIAGNOSIS — N43.40 SPERMATOCELE: Primary | ICD-10-CM

## 2025-01-06 PROCEDURE — 99024 POSTOP FOLLOW-UP VISIT: CPT | Performed by: UROLOGY

## 2025-01-06 NOTE — PROGRESS NOTES
UROLOGY PROGRESS NOTE         NAME: Ian Monroe  AGE: 64 y.o. SEX: male  : 1960   MRN: [id number]    DATE: 2025  TIME: 10:31 AM    Assessment and Plan      Impression:   1. Spermatocele    Status post left spermatocelectomy on Friday.  Drain removed today.  He is doing well.  He does have some ecchymosis should resolve slowly.   Plan: Follow-up 6 weeks.  Continue to avoid vigorous activity for couple weeks.      Chief Complaint     Chief Complaint   Patient presents with    Follow-up     Possible drainage     History of Present Illness     HPI: Ian Monroe is a 64 y.o. year old male who presents with status post spermatocelectomy on Friday.  Has done well from a pain standpoint.  He does have some ecchymosis.  Minimal drainage.  He is here for drain removal today.  We did remove the drain without incident.  He will finish his antibiotics and 2 more days.  He has not really required any pain medicine.  He will continue with no vigorous activity for couple weeks.  He he may shower.  He can soak in hot water after 48 hours.  He will speak up if he has any troubles.              The following portions of the patient's history were reviewed and updated as appropriate: allergies, current medications, past family history, past medical history, past social history, past surgical history and problem list.  Past Medical History:   Diagnosis Date    BPH (benign prostatic hyperplasia)     Hematuria     HTN (hypertension)     Hyperlipidemia     Hypertension     Kidney stone     Renal calculi     Spermatocele     Umbilical hernia      Past Surgical History:   Procedure Laterality Date    COLONOSCOPY      LITHOTRIPSY      NH EXCISION SPERMATOCELE W/WO EPIDIDYMECTOMY Left 1/3/2025    Procedure: SPERMATOCELECTOMY;  Surgeon: Frank D'Amico, MD;  Location:  MAIN OR;  Service: Urology     shoulder  Review of Systems     Const: Denies chills, fever and weight loss.  CV: Denies chest pain.  Resp: Denies SOB.  GI:  "Denies abdominal pain, nausea and vomiting.  : Denies symptoms other than stated above.  Musculo: Denies back pain.    Objective   /66 (BP Location: Left arm, Patient Position: Sitting, Cuff Size: Standard)   Pulse 94   Temp 98.3 °F (36.8 °C) (Temporal)   Ht 5' 10\" (1.778 m)   Wt 82.6 kg (182 lb)   SpO2 98%   BMI 26.11 kg/m²     Physical Exam  Const: Appears healthy and well developed. No signs of acute distress present.  Resp: Respirations are regular and unlabored.   CV: Rate is regular. Rhythm is regular.  Abdomen: Abdomen is soft, nontender, and nondistended. Kidneys are not palpable.  : Drain removed.  Mild ecchymosis scrotum.  Mild swelling.  Psych: Patient's attitude is cooperative. Mood is normal. Affect is normal.    Current Medications     Current Outpatient Medications:     ciprofloxacin (CIPRO) 500 mg tablet, Take 1 tablet (500 mg total) by mouth every 12 (twelve) hours for 5 days, Disp: 10 tablet, Rfl: 0    lisinopril (ZESTRIL) 10 mg tablet, Take 10 mg by mouth daily, Disp: , Rfl:     loratadine (CLARITIN) 10 mg tablet, Take 10 mg by mouth if needed, Disp: , Rfl:     methylcellulose oral powder, Take by mouth as needed, Disp: , Rfl:     simvastatin (ZOCOR) 20 mg tablet, Take 20 mg by mouth daily at bedtime, Disp: , Rfl:     Coenzyme Q10 10 MG capsule, Take 10 mg by mouth daily (Patient not taking: Reported on 1/6/2025), Disp: , Rfl:     METAMUCIL FIBER PO, Take by mouth (Patient not taking: Reported on 1/6/2025), Disp: , Rfl:     oxyCODONE-acetaminophen (PERCOCET) 5-325 mg per tablet, Take 1-2 tablets by mouth every 6 (six) hours as needed for moderate pain or severe pain Max Daily Amount: 8 tablets (Patient not taking: Reported on 1/6/2025), Disp: 20 tablet, Rfl: 0        Frank D'Amico, MD        "

## 2025-01-07 PROCEDURE — 88304 TISSUE EXAM BY PATHOLOGIST: CPT | Performed by: PATHOLOGY

## 2025-02-18 ENCOUNTER — OFFICE VISIT (OUTPATIENT)
Dept: UROLOGY | Facility: CLINIC | Age: 65
End: 2025-02-18
Payer: COMMERCIAL

## 2025-02-18 VITALS
OXYGEN SATURATION: 99 % | SYSTOLIC BLOOD PRESSURE: 152 MMHG | TEMPERATURE: 98 F | DIASTOLIC BLOOD PRESSURE: 98 MMHG | BODY MASS INDEX: 26.34 KG/M2 | HEART RATE: 97 BPM | WEIGHT: 184 LBS | HEIGHT: 70 IN

## 2025-02-18 DIAGNOSIS — N43.40 SPERMATOCELE: Primary | ICD-10-CM

## 2025-02-18 DIAGNOSIS — N40.1 BENIGN PROSTATIC HYPERPLASIA WITH NOCTURIA: ICD-10-CM

## 2025-02-18 DIAGNOSIS — R35.1 BENIGN PROSTATIC HYPERPLASIA WITH NOCTURIA: ICD-10-CM

## 2025-02-18 DIAGNOSIS — Z80.42 FAMILY HISTORY OF PROSTATE CANCER IN FATHER: ICD-10-CM

## 2025-02-18 LAB
SL AMB  POCT GLUCOSE, UA: NORMAL
SL AMB LEUKOCYTE ESTERASE,UA: NORMAL
SL AMB POCT BILIRUBIN,UA: NORMAL
SL AMB POCT BLOOD,UA: NORMAL
SL AMB POCT CLARITY,UA: CLEAR
SL AMB POCT COLOR,UA: YELLOW
SL AMB POCT KETONES,UA: NORMAL
SL AMB POCT NITRITE,UA: NORMAL
SL AMB POCT PH,UA: 6.5
SL AMB POCT SPECIFIC GRAVITY,UA: 1.01
SL AMB POCT URINE PROTEIN: NORMAL
SL AMB POCT UROBILINOGEN: 0.2

## 2025-02-18 PROCEDURE — 81003 URINALYSIS AUTO W/O SCOPE: CPT | Performed by: UROLOGY

## 2025-02-18 PROCEDURE — 99213 OFFICE O/P EST LOW 20 MIN: CPT | Performed by: UROLOGY

## 2025-02-18 NOTE — PROGRESS NOTES
UROLOGY PROGRESS NOTE         NAME: Ian Monroe  AGE: 64 y.o. SEX: male  : 1960   MRN: [id number]    DATE: 2025  TIME: 11:26 AM    Assessment and Plan      Impression:   1. Spermatocele  -     POCT urine dip auto non-scope  2. Benign prostatic hyperplasia with nocturia  3. Family history of prostate cancer in father    Will continue checking him on a yearly basis.  His family doctor does check his PSA.   Plan: Moist heat and anti-inflammatories will help with the healing process along.  He is doing well at this point.  Status post left spermatocele ectomy.  6 weeks      Chief Complaint     Chief Complaint   Patient presents with    post op check     History of Present Illness     HPI: Ian Monroe is a 64 y.o. year old male who presents with status post left spermatocelectomy 6 weeks ago.  Induration and jabbing pain is slowly improving.  Swelling is also improving.  At this point he is happy with the results but should have continued improvement.  He does have some slower intermittent flow when he wakes up at night.  This is new.  Likely related to mild BPH.  His father did have prostate cancer with a radical prostatectomy.  He  at age 90.  Diagnosed at age 70.  PSAs have been normal.  Last checked a few months ago.              The following portions of the patient's history were reviewed and updated as appropriate: allergies, current medications, past family history, past medical history, past social history, past surgical history and problem list.  Past Medical History:   Diagnosis Date    BPH (benign prostatic hyperplasia)     Hematuria     HTN (hypertension)     Hyperlipidemia     Hypertension     Kidney stone     Renal calculi     Spermatocele     Umbilical hernia      Past Surgical History:   Procedure Laterality Date    COLONOSCOPY      LITHOTRIPSY      OK EXCISION SPERMATOCELE W/WO EPIDIDYMECTOMY Left 1/3/2025    Procedure: SPERMATOCELECTOMY;  Surgeon: Frank D'Amico, MD;  Location:  "[facility];  Service: Urology     shoulder  Review of Systems     Const: Denies chills, fever and weight loss.  CV: Denies chest pain.  Resp: Denies SOB.  GI: Denies abdominal pain, nausea and vomiting.  : Denies symptoms other than stated above.  Musculo: Denies back pain.    Objective   /98   Pulse 97   Temp 98 °F (36.7 °C)   Ht 5' 10\" (1.778 m)   Wt 83.5 kg (184 lb)   SpO2 99%   BMI 26.40 kg/m²     Physical Exam  Const: Appears healthy and well developed. No signs of acute distress present.  Resp: Respirations are regular and unlabored.   CV: Rate is regular. Rhythm is regular.  Abdomen: Abdomen is soft, nontender, and nondistended. Kidneys are not palpable.  : Wound healing nicely.  Still some induration around the left cord structure.  Should continue to improve.  Some discomfort with exam.  Psych: Patient's attitude is cooperative. Mood is normal. Affect is normal.    Current Medications     Current Outpatient Medications:     Coenzyme Q10 (CO Q 10 PO), Take 1 tablet by mouth daily in the early morning, Disp: , Rfl:     Fexofenadine-Pseudoephedrine (ALLEGRA-D 12 HOUR PO), Take 1 tablet by mouth daily in the early morning (Patient taking differently: Take 1 tablet by mouth if needed), Disp: , Rfl:     lisinopril (ZESTRIL) 10 mg tablet, Take 10 mg by mouth daily, Disp: , Rfl:     loratadine (CLARITIN) 10 mg tablet, Take 10 mg by mouth if needed, Disp: , Rfl:     methylcellulose oral powder, Take by mouth as needed, Disp: , Rfl:     simvastatin (ZOCOR) 20 mg tablet, Take 20 mg by mouth daily at bedtime, Disp: , Rfl:     Coenzyme Q10 10 MG capsule, Take 10 mg by mouth daily (Patient not taking: Reported on 11/6/2024), Disp: , Rfl:     METAMUCIL FIBER PO, Take by mouth (Patient not taking: Reported on 2/18/2025), Disp: , Rfl:     oxyCODONE-acetaminophen (PERCOCET) 5-325 mg per tablet, Take 1-2 tablets by mouth every 6 (six) hours as needed for moderate pain or severe pain Max Daily Amount: 8 tablets " (Patient not taking: Reported on 2/18/2025), Disp: 20 tablet, Rfl: 0        Frank D'Amico, MD

## (undated) DEVICE — SINGLE PORT MANIFOLD: Brand: NEPTUNE 2

## (undated) DEVICE — SUT ETHILON 2-0 PS 18 IN 585H

## (undated) DEVICE — TUBING SUCTION 5MM X 12 FT

## (undated) DEVICE — JACKSON-PRATT 100CC BULB RESERVOIR: Brand: CARDINAL HEALTH

## (undated) DEVICE — KERLIX BANDAGE ROLL: Brand: KERLIX

## (undated) DEVICE — NEEDLE 25G X 1 1/2

## (undated) DEVICE — PAD GROUNDING DUAL ADULT

## (undated) DEVICE — GLOVE SRG BIOGEL 6.5

## (undated) DEVICE — DISPOSABLE OR TOWEL: Brand: CARDINAL HEALTH

## (undated) DEVICE — SCD SEQUENTIAL COMPRESSION COMFORT SLEEVE MEDIUM KNEE LENGTH: Brand: KENDALL SCD

## (undated) DEVICE — DRAPE EQUIPMENT RF WAND

## (undated) DEVICE — MEDI-VAC YANKAUER SUCTION HANDLE W/BULBOUS AND CONTROL VENT: Brand: CARDINAL HEALTH

## (undated) DEVICE — TELFA NON-ADHERENT ABSORBENT DRESSING: Brand: TELFA

## (undated) DEVICE — ELECTRODE NEEDLE MOD E-Z CLEAN 2.75IN 7CM -0013M

## (undated) DEVICE — SUT CHROMIC 3-0 SH 27 IN G122H

## (undated) DEVICE — WET SKIN PREP TRAY: Brand: MEDLINE INDUSTRIES, INC.

## (undated) DEVICE — BETHLEHEM UNIVERSAL MINOR GEN: Brand: CARDINAL HEALTH

## (undated) DEVICE — GAUZE SPONGES,16 PLY: Brand: CURITY

## (undated) DEVICE — DRAPE ADOLESCENT LAPAROTOMY

## (undated) DEVICE — INTENDED FOR TISSUE SEPARATION, AND OTHER PROCEDURES THAT REQUIRE A SHARP SURGICAL BLADE TO PUNCTURE OR CUT.: Brand: BARD-PARKER SAFETY BLADES SIZE 15, STERILE

## (undated) DEVICE — NEPTUNE E-SEP SMOKE EVACUATION PENCIL, COATED, 70MM BLADE, PUSH BUTTON SWITCH: Brand: NEPTUNE E-SEP

## (undated) DEVICE — EXOFIN PRECISION PEN HIGH VISCOSITY TOPICAL SKIN ADHESIVE: Brand: EXOFIN PRECISION PEN, 1G

## (undated) DEVICE — SUT MONOCRYL PLUS 4-0 PS-2 18 IN MCP496G

## (undated) DEVICE — SCROTAL SUPPORT LGE

## (undated) DEVICE — JP PERF DRN SIL FLT 7MM FULL: Brand: CARDINAL HEALTH